# Patient Record
Sex: MALE | Race: WHITE | NOT HISPANIC OR LATINO | Employment: FULL TIME | URBAN - METROPOLITAN AREA
[De-identification: names, ages, dates, MRNs, and addresses within clinical notes are randomized per-mention and may not be internally consistent; named-entity substitution may affect disease eponyms.]

---

## 2018-05-02 ENCOUNTER — HOSPITAL ENCOUNTER (EMERGENCY)
Facility: HOSPITAL | Age: 58
Discharge: HOME | End: 2018-05-02
Attending: INTERNAL MEDICINE
Payer: COMMERCIAL

## 2018-05-02 VITALS
BODY MASS INDEX: 23.3 KG/M2 | WEIGHT: 172 LBS | HEIGHT: 72 IN | OXYGEN SATURATION: 99 % | SYSTOLIC BLOOD PRESSURE: 146 MMHG | HEART RATE: 86 BPM | RESPIRATION RATE: 18 BRPM | TEMPERATURE: 98 F | DIASTOLIC BLOOD PRESSURE: 102 MMHG

## 2018-05-02 DIAGNOSIS — R05.9 COUGH: ICD-10-CM

## 2018-05-02 DIAGNOSIS — I50.9 CONGESTIVE HEART FAILURE, UNSPECIFIED HF CHRONICITY, UNSPECIFIED HEART FAILURE TYPE: Primary | ICD-10-CM

## 2018-05-02 LAB
ALBUMIN SERPL BCP-MCNC: 3.9 G/DL
ALP SERPL-CCNC: 64 U/L
ALT SERPL W/O P-5'-P-CCNC: 21 U/L
ANION GAP SERPL CALC-SCNC: 7 MMOL/L
AST SERPL-CCNC: 22 U/L
BASOPHILS # BLD AUTO: 0.06 K/UL
BASOPHILS NFR BLD: 0.7 %
BILIRUB SERPL-MCNC: 0.8 MG/DL
BNP SERPL-MCNC: 1600 PG/ML
BUN SERPL-MCNC: 16 MG/DL
CALCIUM SERPL-MCNC: 8.9 MG/DL
CHLORIDE SERPL-SCNC: 106 MMOL/L
CO2 SERPL-SCNC: 25 MMOL/L
CREAT SERPL-MCNC: 1.2 MG/DL
D DIMER PPP FEU-MCNC: 148 NG/ML
DIFFERENTIAL METHOD: ABNORMAL
EOSINOPHIL # BLD AUTO: 0.1 K/UL
EOSINOPHIL NFR BLD: 1.4 %
ERYTHROCYTE [DISTWIDTH] IN BLOOD BY AUTOMATED COUNT: 13.7 %
EST. GFR  (AFRICAN AMERICAN): >60 ML/MIN/1.73 M^2
EST. GFR  (NON AFRICAN AMERICAN): >60 ML/MIN/1.73 M^2
GLUCOSE SERPL-MCNC: 108 MG/DL
HCT VFR BLD AUTO: 43.9 %
HGB BLD-MCNC: 15.3 G/DL
IMM GRANULOCYTES # BLD AUTO: 0.02 K/UL
IMM GRANULOCYTES NFR BLD AUTO: 0.2 %
LYMPHOCYTES # BLD AUTO: 1.9 K/UL
LYMPHOCYTES NFR BLD: 20.3 %
MCH RBC QN AUTO: 33.6 PG
MCHC RBC AUTO-ENTMCNC: 34.9 G/DL
MCV RBC AUTO: 96 FL
MONOCYTES # BLD AUTO: 0.6 K/UL
MONOCYTES NFR BLD: 6.9 %
MYCOPLASMA AB SER QL: NORMAL
NEUTROPHILS # BLD AUTO: 6.4 K/UL
NEUTROPHILS NFR BLD: 70.5 %
NRBC BLD-RTO: 0 /100 WBC
PLATELET # BLD AUTO: 213 K/UL
PMV BLD AUTO: 9.6 FL
POTASSIUM SERPL-SCNC: 3.4 MMOL/L
PROT SERPL-MCNC: 7.1 G/DL
RBC # BLD AUTO: 4.56 M/UL
SODIUM SERPL-SCNC: 138 MMOL/L
WBC # BLD AUTO: 9.11 K/UL

## 2018-05-02 PROCEDURE — 71046 X-RAY EXAM CHEST 2 VIEWS: CPT | Mod: TC,FY

## 2018-05-02 PROCEDURE — 85025 COMPLETE CBC W/AUTO DIFF WBC: CPT

## 2018-05-02 PROCEDURE — 96374 THER/PROPH/DIAG INJ IV PUSH: CPT

## 2018-05-02 PROCEDURE — 63600175 PHARM REV CODE 636 W HCPCS: Performed by: FAMILY MEDICINE

## 2018-05-02 PROCEDURE — 85379 FIBRIN DEGRADATION QUANT: CPT

## 2018-05-02 PROCEDURE — 80053 COMPREHEN METABOLIC PANEL: CPT

## 2018-05-02 PROCEDURE — 71046 X-RAY EXAM CHEST 2 VIEWS: CPT | Mod: 26,,, | Performed by: RADIOLOGY

## 2018-05-02 PROCEDURE — 99284 EMERGENCY DEPT VISIT MOD MDM: CPT | Mod: 25

## 2018-05-02 PROCEDURE — 83880 ASSAY OF NATRIURETIC PEPTIDE: CPT

## 2018-05-02 PROCEDURE — 86738 MYCOPLASMA ANTIBODY: CPT

## 2018-05-02 PROCEDURE — 25000003 PHARM REV CODE 250: Performed by: NURSE PRACTITIONER

## 2018-05-02 RX ORDER — FUROSEMIDE 40 MG/1
40 TABLET ORAL DAILY
Qty: 30 TABLET | Refills: 0 | Status: SHIPPED | OUTPATIENT
Start: 2018-05-02 | End: 2019-05-02

## 2018-05-02 RX ORDER — FUROSEMIDE 10 MG/ML
40 INJECTION INTRAMUSCULAR; INTRAVENOUS
Status: COMPLETED | OUTPATIENT
Start: 2018-05-02 | End: 2018-05-02

## 2018-05-02 RX ORDER — CLONIDINE HYDROCHLORIDE 0.1 MG/1
0.1 TABLET ORAL
Status: COMPLETED | OUTPATIENT
Start: 2018-05-02 | End: 2018-05-02

## 2018-05-02 RX ORDER — LISINOPRIL 10 MG/1
10 TABLET ORAL DAILY
Qty: 30 TABLET | Refills: 0 | Status: SHIPPED | OUTPATIENT
Start: 2018-05-02 | End: 2019-05-02

## 2018-05-02 RX ADMIN — FUROSEMIDE 40 MG: 10 INJECTION, SOLUTION INTRAMUSCULAR; INTRAVENOUS at 07:05

## 2018-05-02 RX ADMIN — CLONIDINE HYDROCHLORIDE 0.1 MG: 0.1 TABLET ORAL at 06:05

## 2018-05-02 NOTE — ED PROVIDER NOTES
Encounter Date: 5/2/2018       History   No chief complaint on file.    Cough for weeks.  SOB 2 nights ago.            Review of patient's allergies indicates:  Allergies not on file  No past medical history on file.  No past surgical history on file.  No family history on file.  Social History   Substance Use Topics    Smoking status: Not on file    Smokeless tobacco: Not on file    Alcohol use Not on file     Review of Systems   Respiratory: Positive for cough and shortness of breath.    All other systems reviewed and are negative.      Physical Exam     Initial Vitals   BP Pulse Resp Temp SpO2   -- -- -- -- --      MAP       --         Physical Exam    Nursing note and vitals reviewed.  Constitutional: He appears well-developed and well-nourished.   HENT:   Head: Normocephalic.   Right Ear: External ear normal.   Left Ear: External ear normal.   Nose: Nose normal.   Eyes: Conjunctivae and EOM are normal. Pupils are equal, round, and reactive to light.   Neck: Normal range of motion. Neck supple.   Cardiovascular: Normal rate and regular rhythm.   Abdominal: Soft. Bowel sounds are normal.   Musculoskeletal: Normal range of motion. He exhibits edema.   +1 pitting edema noted to right ankle   Neurological: He is alert and oriented to person, place, and time. He has normal strength and normal reflexes.   Skin: Skin is warm and dry. Capillary refill takes 2 to 3 seconds.   Psychiatric: He has a normal mood and affect. His behavior is normal. Judgment and thought content normal.         ED Course   Procedures  Labs Reviewed - No data to display          Medical Decision Making:   Clinical Tests:   Lab Tests: Ordered and Reviewed  The following lab test(s) were unremarkable: CBC, CMP and BNP  Radiological Study: Ordered and Reviewed  ED Management:  CxR looks like pulmonary edema. Pt has no hx of CHF. Labs ordered and confirmed suspicion of new onset CHF.   I have discussed admission with this pt and he states he  absolutely can not be admitted tonight because his daughter is graduating 12 grade tomorrow. I have explained to him that he is in congestive heart failure and what this could mean. He states he will happily return for admission immediately after graduation but will not be admitted tonight. I will give him IV dose of lasix and have strongly encouraged him to return as soon as possible.                      Clinical Impression:   The primary encounter diagnosis was Congestive heart failure, unspecified HF chronicity, unspecified heart failure type. A diagnosis of Cough was also pertinent to this visit.                           Toyin Corley MD  05/02/18 1953

## 2018-05-05 ENCOUNTER — HOSPITAL ENCOUNTER (EMERGENCY)
Facility: HOSPITAL | Age: 58
Discharge: HOME | End: 2018-05-05
Attending: EMERGENCY MEDICINE
Payer: COMMERCIAL

## 2018-05-05 VITALS
RESPIRATION RATE: 16 BRPM | SYSTOLIC BLOOD PRESSURE: 128 MMHG | OXYGEN SATURATION: 95 % | HEIGHT: 72 IN | TEMPERATURE: 98 F | BODY MASS INDEX: 24.38 KG/M2 | WEIGHT: 180 LBS | DIASTOLIC BLOOD PRESSURE: 89 MMHG | HEART RATE: 90 BPM

## 2018-05-05 DIAGNOSIS — I50.9 CONGESTIVE HEART FAILURE, UNSPECIFIED HF CHRONICITY, UNSPECIFIED HEART FAILURE TYPE: Primary | ICD-10-CM

## 2018-05-05 DIAGNOSIS — R05.9 COUGH: ICD-10-CM

## 2018-05-05 DIAGNOSIS — R93.89 INFILTRATE NOTED ON IMAGING STUDY: ICD-10-CM

## 2018-05-05 LAB — BNP SERPL-MCNC: 737 PG/ML

## 2018-05-05 PROCEDURE — 71046 X-RAY EXAM CHEST 2 VIEWS: CPT | Mod: 26,,, | Performed by: RADIOLOGY

## 2018-05-05 PROCEDURE — 83880 ASSAY OF NATRIURETIC PEPTIDE: CPT

## 2018-05-05 PROCEDURE — 36415 COLL VENOUS BLD VENIPUNCTURE: CPT

## 2018-05-05 PROCEDURE — 71046 X-RAY EXAM CHEST 2 VIEWS: CPT | Mod: TC,FY

## 2018-05-05 PROCEDURE — 99284 EMERGENCY DEPT VISIT MOD MDM: CPT | Mod: 25

## 2018-05-05 RX ORDER — ASPIRIN 81 MG/1
81 TABLET ORAL DAILY
COMMUNITY

## 2018-05-05 RX ORDER — AZITHROMYCIN 250 MG/1
250 TABLET, FILM COATED ORAL DAILY
Qty: 6 TABLET | Refills: 0 | Status: SHIPPED | OUTPATIENT
Start: 2018-05-05

## 2018-05-05 NOTE — ED NOTES
"Pt return to the ER today after being seen on 5/2/2018 and was told to return immediately for reevaluation. Pt states he feels "much better". Pt denies any chest pain or shortness of breath. Respirations equal and unlabored. Pt updated on plan of care. No needs voiced at this time. Will continue to monitor.   "

## 2018-05-05 NOTE — ED PROVIDER NOTES
"CHIEF COMPLAINT  Chief Complaint   Patient presents with    Congestive Heart Failure       HPI  Devyn Obrien a 57 y.o. male who presents to the ED. He was in the ED on 05/02/2018 with SOB. Was found to have new onset CHF. Admission was recommended but he declined because his daughter was graduating High School the next day. He was told to come back for admission as soon as possible. Was given IV lasix while in ED and a prescription for Lasix and Lisinopril.   Pt states he feels much better. Breathing is better and ankle swelling "is gone"  No fever. Does have a productive cough of yellow phlegm.       CURRENT MEDICATIONS  No current facility-administered medications on file prior to encounter.      Current Outpatient Prescriptions on File Prior to Encounter   Medication Sig Dispense Refill    furosemide (LASIX) 40 MG tablet Take 1 tablet (40 mg total) by mouth once daily. 30 tablet 0    lisinopril 10 MG tablet Take 1 tablet (10 mg total) by mouth once daily. 30 tablet 0       ALLERGIES  Review of patient's allergies indicates:   Allergen Reactions    Codeine Nausea And Vomiting    Penicillins Rash         There is no immunization history on file for this patient.    PAST MEDICAL HISTORY  History reviewed. No pertinent past medical history.    SURGICAL HISTORY  History reviewed. No pertinent surgical history.    SOCIAL HISTORY  Social History     Social History    Marital status:      Spouse name: N/A    Number of children: N/A    Years of education: N/A     Occupational History    Not on file.     Social History Main Topics    Smoking status: Current Every Day Smoker    Smokeless tobacco: Not on file    Alcohol use No    Drug use: No    Sexual activity: Yes     Other Topics Concern    Not on file     Social History Narrative    No narrative on file       FAMILY HISTORY  No family history on file.    REVIEW OF SYSTEMS  Constitutional: No fever, chills, or weakness.  Eyes: No redness, pain, or " discharge  HENT: No ear pain, no headache, no rhinorrhea, no throat pain  Respiratory: + cough, No wheezing. No further shortness of breath  Cardiovascular: No chest pain, palpitations or edema  GI: No abdominal pain, nausea, vomiting or diarrhea  Gu: No dysuria, no hematuria, or discharge  Musculoskeletal: No pain, full range of motion. Good sensation  Skin: No rash or abrasion  Neurologic: No focal weakness or sensory changes.  All systems otherwise negative except as noted in the Review of Systems and History of Present Illness      PHYSICAL EXAM  Reviewed Triage Note  VITAL SIGNS:   Patient Vitals for the past 24 hrs:   BP Temp Temp src Pulse Resp SpO2 Height Weight   05/05/18 1219 128/89 - - 90 16 95 % - -   05/05/18 1110 (!) 143/105 97.6 °F (36.4 °C) Oral 88 18 96 % 6' (1.829 m) 81.6 kg (180 lb)     Constitutional: Well developed, well nourished, Alert and oriented x3, No acute distress, non-toxic appearance.  HENT: Normocephalic, Atraumatic, Bilateral external ears normal, external nose negative, oropharynx moist, No oral exudates.  Eyes: PERRL, EOMI, Conjunctiva normal, No discharge.  Neck: Normal range of motion, no tenderness, supple, no carotid bruits. No JVD  Respiratory: Normal breath sounds, no respiratory distress, no wheezing, no rhonchi, no rales. No conversational dyspnea  Cardiovascular: Normal heart rate, normal rhythm, no murmurs, no rubs, no gallops.  Gi: Bowel sounds normal, soft, no tenderness, non-distended, no masses, no pulsatile masses.  Musculoskeletal: No edema, no tenderness, no cyanosis, no clubbing. Good range of motion in all major joints. No tenderness to palpation or major deformities noted.   Integument: Warm, Dry, No erythema, no rash  Neurologic: Normal motor function, normal sensory function. No focal deficits noted. Intact distal pulses  Psychiatric: Affect normal, judgment normal, mood normal      LABS  Pertinent labs reviewed. (see chart for details)  Labs Reviewed   B-TYPE  NATRIURETIC PEPTIDE - Abnormal; Notable for the following:        Result Value     (*)     All other components within normal limits       RADIOLOGY  X-Ray Chest PA And Lateral   Final Result      Minimal residual right basilar infiltrate.  Resolved small right pleural effusion.         Electronically signed by: Joshua Bond MD   Date:    05/05/2018   Time:    11:46            PROCEDURE  Procedures      ED COURSE & MEDICAL DECISION MAKING  ED Course as of May 05 1327   Sat May 05, 2018   1119 X-Ray Chest PA And Lateral [NP]      ED Course User Index  [NP] CHRISTY Castillo     Xray films reviewed by Myself and EDMD      MDM     CXR is improved and BNP is decreased since last visit. Pt is asymptomatic and states feels much better.   Discussed with Dr Clements, will discharge to home and follow up with PCP next week. Return to ED for any worsening of symptoms.       Physical exam findings discussed with patient. No acute emergent medical condition identified at this time to warrant further testing. Will dispo home with instructions to follow up with PCP next week, return to the ED for worsening condition. Pt agrees with plan of care.     DISPOSITION  Patient discharged in stable condition 5/5/2018  1:07 PM      CLINICAL IMPRESSION:  The primary encounter diagnosis was Congestive heart failure, unspecified HF chronicity, unspecified heart failure type. Diagnoses of Cough and Infiltrate noted on imaging study were also pertinent to this visit.    Patient advised to follow-up with your PCP within 3 days for BP re-check if Blood Pressure was >120/80 without history of hypertension.         CHRISTY Castillo  05/05/18 [id number]

## 2018-05-05 NOTE — ED TRIAGE NOTES
Respirations even unlabored, denies chest pain or pressure. States cough/congestion improved and no swelling of extremeties